# Patient Record
Sex: MALE | URBAN - METROPOLITAN AREA
[De-identification: names, ages, dates, MRNs, and addresses within clinical notes are randomized per-mention and may not be internally consistent; named-entity substitution may affect disease eponyms.]

---

## 2017-03-14 LAB
ALBUMIN SERPL-MCNC: 4.9 G/DL (ref 3.5–5.5)
ALBUMIN/CREAT UR: 24.3 MG/G CREAT (ref 0–30)
ALBUMIN/GLOB SERPL: 1.9 {RATIO} (ref 1.2–2.2)
ALP SERPL-CCNC: 74 IU/L (ref 39–117)
ALT SERPL-CCNC: 14 IU/L (ref 0–44)
AST SERPL-CCNC: 9 IU/L (ref 0–40)
BILIRUB SERPL-MCNC: 0.4 MG/DL (ref 0–1.2)
BUN SERPL-MCNC: 17 MG/DL (ref 6–24)
BUN/CREAT SERPL: 21 (ref 9–20)
CALCIUM SERPL-MCNC: 8.8 MG/DL (ref 8.7–10.2)
CHLORIDE SERPL-SCNC: 102 MMOL/L (ref 96–106)
CHOLEST SERPL-MCNC: 134 MG/DL (ref 100–199)
CO2 SERPL-SCNC: 22 MMOL/L (ref 18–29)
CREAT SERPL-MCNC: 0.82 MG/DL (ref 0.76–1.27)
CREAT UR-MCNC: 177.2 MG/DL
EST. AVERAGE GLUCOSE BLD GHB EST-MCNC: 174 MG/DL
GLOBULIN SER CALC-MCNC: 2.6 G/DL (ref 1.5–4.5)
GLUCOSE SERPL-MCNC: 132 MG/DL (ref 65–99)
HBA1C MFR BLD: 7.7 % (ref 4.8–5.6)
HDLC SERPL-MCNC: 26 MG/DL
INTERPRETATION, 910389: NORMAL
LDLC SERPL CALC-MCNC: 84 MG/DL (ref 0–99)
Lab: NORMAL
MICROALBUMIN UR-MCNC: 43 UG/ML
POTASSIUM SERPL-SCNC: 4.8 MMOL/L (ref 3.5–5.2)
PROT SERPL-MCNC: 7.5 G/DL (ref 6–8.5)
SODIUM SERPL-SCNC: 139 MMOL/L (ref 134–144)
TRIGL SERPL-MCNC: 121 MG/DL (ref 0–149)
TSH SERPL DL<=0.005 MIU/L-ACNC: 1.22 UIU/ML (ref 0.45–4.5)
VLDLC SERPL CALC-MCNC: 24 MG/DL (ref 5–40)

## 2017-03-20 ENCOUNTER — OFFICE VISIT (OUTPATIENT)
Dept: ENDOCRINOLOGY | Age: 57
End: 2017-03-20

## 2017-03-20 VITALS
DIASTOLIC BLOOD PRESSURE: 60 MMHG | TEMPERATURE: 97 F | SYSTOLIC BLOOD PRESSURE: 103 MMHG | HEIGHT: 66 IN | BODY MASS INDEX: 20.72 KG/M2 | HEART RATE: 67 BPM | WEIGHT: 128.9 LBS | RESPIRATION RATE: 16 BRPM

## 2017-03-20 DIAGNOSIS — E11.65 UNCONTROLLED TYPE 2 DIABETES MELLITUS WITH HYPERGLYCEMIA, WITH LONG-TERM CURRENT USE OF INSULIN (HCC): ICD-10-CM

## 2017-03-20 DIAGNOSIS — E11.65 TYPE 2 DIABETES MELLITUS WITH HYPERGLYCEMIA, WITHOUT LONG-TERM CURRENT USE OF INSULIN (HCC): Primary | ICD-10-CM

## 2017-03-20 DIAGNOSIS — E03.4 HYPOTHYROIDISM DUE TO ACQUIRED ATROPHY OF THYROID: ICD-10-CM

## 2017-03-20 DIAGNOSIS — E78.5 HYPERLIPIDEMIA, UNSPECIFIED HYPERLIPIDEMIA TYPE: ICD-10-CM

## 2017-03-20 DIAGNOSIS — E78.2 MIXED HYPERLIPIDEMIA: ICD-10-CM

## 2017-03-20 DIAGNOSIS — Z79.4 UNCONTROLLED TYPE 2 DIABETES MELLITUS WITH HYPERGLYCEMIA, WITH LONG-TERM CURRENT USE OF INSULIN (HCC): ICD-10-CM

## 2017-03-20 DIAGNOSIS — E11.65 UNCONTROLLED TYPE 2 DIABETES MELLITUS WITH HYPERGLYCEMIA, WITHOUT LONG-TERM CURRENT USE OF INSULIN (HCC): ICD-10-CM

## 2017-03-20 RX ORDER — METFORMIN HYDROCHLORIDE 500 MG/1
500 TABLET ORAL 2 TIMES DAILY WITH MEALS
Qty: 180 TAB | Refills: 3 | Status: SHIPPED | OUTPATIENT
Start: 2017-03-20

## 2017-03-20 RX ORDER — LEVOTHYROXINE SODIUM 75 UG/1
75 TABLET ORAL
Qty: 90 TAB | Refills: 3 | Status: SHIPPED | OUTPATIENT
Start: 2017-03-20

## 2017-03-20 RX ORDER — SIMVASTATIN 20 MG/1
20 TABLET, FILM COATED ORAL
Qty: 90 TAB | Refills: 3 | Status: SHIPPED | OUTPATIENT
Start: 2017-03-20

## 2017-03-20 NOTE — PROGRESS NOTES
[facility]               Tila Ríos MD        [address] [address] TM:[phone] Fax [phone] (          Patient Information  Date:3/20/2017  Name : Stella Mcdonald 65 y.o.     YOB: 1960         Referred by: Patient        Chief Complaint   Patient presents with    Diabetes    Thyroid Problem    Cholesterol Problem       History of Present Illness: Stella Mcdonald is a 65 y.o. male here for follow up of  Type 2 Diabetes Mellitus. Type 2 Diabetes was diagnosed in 2005 . End organ effects of diabetes: none. In the past, he was on Glipizide which did not control his diabetes and later on Lantus 8-10 units was added by primary care physician. Reportedly, he had episodes of hypoglycemia and patient stopped insulin along with Glipizide. He could not get Glyxambi ,  He is off Januvia due to expense and recently got the medication   Has been on it for a week       He has increased protein and decreased carbs            Wt Readings from Last 3 Encounters:   03/20/17 128 lb 14.4 oz (58.5 kg)   12/09/16 132 lb (59.9 kg)   08/23/16 130 lb (59 kg)       BP Readings from Last 3 Encounters:   03/20/17 103/60   12/09/16 111/71   08/23/16 106/61           Past Medical History:   Diagnosis Date    Diabetes ([facility] [address]     Hyperlipidemia      Current Outpatient Prescriptions   Medication Sig    SITagliptin (JANUVIA) 100 mg tablet Take 1 Tab by mouth daily. Stop Glyxambi    simvastatin (ZOCOR) 20 mg tablet Take 1 Tab by mouth nightly.  metFORMIN (GLUCOPHAGE) 500 mg tablet Take 1 Tab by mouth two (2) times daily (with meals).  levothyroxine (SYNTHROID) 75 mcg tablet Take 1 Tab by mouth Daily (before breakfast).  dapagliflozin (FARXIGA) 10 mg tab Take 1 Tab by mouth daily. Stop Glyxambi    ASPIRIN (BRUCE LOW STRENGTH PO) Take 81 mg by mouth. No current facility-administered medications for this visit.       Allergies   Allergen Reactions  Other Food Itching     Stawberries, cherries- Throat    Other Plant, Animal, Environmental Itching     Pollen         Review of Systems:  - Constitutional Symptoms: no fevers, no chills  - Eyes: no blurry vision no double vision  - Cardiovascular: no chest pain ,no palpitations  - Integumentary: no rashes  - Neurological: + numbness, tingling, no  headaches  -     Physical Examination:   Blood pressure 103/60, pulse 67, temperature 97 °F (36.1 °C), temperature source Oral, resp. rate 16, height 5' 6\" (1.676 m), weight 128 lb 14.4 oz (58.5 kg). Estimated body mass index is 20.81 kg/(m^2) as calculated from the following:    Height as of this encounter: 5' 6\" (1.676 m). -   Weight as of this encounter: 128 lb 14.4 oz (58.5 kg). - General: pleasant, no distress, good eye contact  - HEENT: no pallor, no periorbital edema, EOMI  - Neck: supple, no thyromegaly, no nodules  - Cardiovascular: regular, normal rate, normal S1 and S2  - Respiratory: clear to auscultation bilaterally  - Gastrointestinal: soft, nontender, nondistended,  BS +  - Musculoskeletal: no acanthosis nigricans,no edema  - Foot - nl   - Psychiatric: normal mood and affect  - Skin: color, texture, turgor normal.       Data Reviewed:     [] Glucose records reviewed. [] See glucose records for details (to be scanned).   [] A1C  [] Reviewed labs    Lab Results   Component Value Date/Time    Hemoglobin A1c 7.7 03/13/2017 08:09 AM    Hemoglobin A1c 7.3 12/02/2016 08:37 AM    Hemoglobin A1c 7.5 08/15/2016 08:39 AM    Glucose 132 03/13/2017 08:09 AM    Glucose  02/24/2015 10:45 AM    Microalb/Creat ratio (ug/mg creat.) 24.3 03/13/2017 08:09 AM    LDL, calculated 84 03/13/2017 08:09 AM    Creatinine 0.82 03/13/2017 08:09 AM      Lab Results   Component Value Date/Time    Cholesterol, total 134 03/13/2017 08:09 AM    HDL Cholesterol 26 03/13/2017 08:09 AM    LDL, calculated 84 03/13/2017 08:09 AM    Triglyceride 121 03/13/2017 08:09 AM     Lab Results Component Value Date/Time    ALT (SGPT) 14 03/13/2017 08:09 AM    AST (SGOT) 9 03/13/2017 08:09 AM    Alk. phosphatase 74 03/13/2017 08:09 AM    Bilirubin, total 0.4 03/13/2017 08:09 AM     Lab Results   Component Value Date/Time    GFR est  03/13/2017 08:09 AM    GFR est non-AA 99 03/13/2017 08:09 AM    Creatinine 0.82 03/13/2017 08:09 AM    BUN 17 03/13/2017 08:09 AM    Sodium 139 03/13/2017 08:09 AM    Potassium 4.8 03/13/2017 08:09 AM    Chloride 102 03/13/2017 08:09 AM    CO2 22 03/13/2017 08:09 AM      Lab Results   Component Value Date/Time    TSH 1.220 03/13/2017 08:09 AM      Assessment/Plan:     1. Type 2 diabetes mellitus with hyperglycemia, without long-term current use of insulin ([facility] [address]    2. Hypothyroidism due to acquired atrophy of thyroid    3. Mixed hyperlipidemia        1. Type 2 Diabetes Mellitus with no known complications  Lab Results   Component Value Date/Time    Hemoglobin A1c 7.7 03/13/2017 08:09 AM    Hemoglobin A1c (POC) 9.1 02/24/2015 10:45 AM      Metformin 500 mg BID   He has no signs of insulin resistance  Januvia and Farxiga    Check blood glucose once daily. His diet is very healthy. FLU annually ,Pneumovax ,aspirin daily,annual eye exam,microalbumin    2. Hypothyroid . No goiter, on LT4    3. Hyperlipidemia : statin    Cannot tolerate ACE due to hypotension           There are no Patient Instructions on file for this visit. Follow-up Disposition: Not on File    Thank you for allowing me to participate in the care of this patient.     George Stone MD

## 2017-03-20 NOTE — PROGRESS NOTES
Donna Davis is a 65 y.o. male here for   Chief Complaint   Patient presents with    Diabetes    Thyroid Problem    Cholesterol Problem       Functional glucose monitor and record keeping system? - yes  Eye exam within last year? - no  Foot exam within last year? - yes    Lab Results   Component Value Date/Time    Hemoglobin A1c 7.7 03/13/2017 08:09 AM    Hemoglobin A1c (POC) 9.1 02/24/2015 10:45 AM       Wt Readings from Last 3 Encounters:   12/09/16 132 lb (59.9 kg)   08/23/16 130 lb (59 kg)   03/04/16 126 lb (57.2 kg)     Temp Readings from Last 3 Encounters:   12/09/16 96.1 °F (35.6 °C) (Oral)   08/23/16 97.5 °F (36.4 °C)   03/04/16 98.6 °F (37 °C) (Oral)     BP Readings from Last 3 Encounters:   12/09/16 111/71   08/23/16 106/61   03/04/16 115/74     Pulse Readings from Last 3 Encounters:   12/09/16 69   08/23/16 73   03/04/16 67

## 2017-03-20 NOTE — MR AVS SNAPSHOT
Visit Information Date & Time Provider Department Dept. Phone Encounter #  
 3/20/2017  4:15 PM Alfonzo Liu MD [facility] [phone] [id number] Follow-up Instructions Return in about 6 months (around 9/20/2017). Your Appointments 9/22/2017  3:45 PM  
ROUTINE CARE with Alfonzo Liu MD  
[facility] [address] [facility]) Appt Note: f/u 6 month. DM  
 [address] [facility] [address]  
[phone]  
  
   
 [address] [place] [address] Upcoming Health Maintenance Date Due Hepatitis C Screening 1960 FOOT EXAM Q1 1/15/1971 EYE EXAM RETINAL OR DILATED Q1 1/15/1971 Pneumococcal 19-64 Medium Risk (1 of 1 - PPSV23) 1/15/1980 DTaP/Tdap/Td series (1 - Tdap) 1/15/1982 FOBT Q 1 YEAR AGE 50-75 1/15/2011 INFLUENZA AGE 9 TO ADULT 8/1/2016 HEMOGLOBIN A1C Q6M 9/13/2017 MICROALBUMIN Q1 3/13/2018 LIPID PANEL Q1 3/13/2018 Allergies as of 3/20/2017  Review Complete On: 3/20/2017 By: Caprice Gil LPN Severity Noted Reaction Type Reactions Other Food  02/24/2015    Itching Stawberries, cherries- Throat Other Plant, Animal, Environmental  04/17/2015    Itching Pollen Current Immunizations  Never Reviewed No immunizations on file. Not reviewed this visit You Were Diagnosed With   
  
 Codes Comments Type 2 diabetes mellitus with hyperglycemia, without long-term current use of insulin (HCC)    -  Primary ICD-10-CM: E11.65 ICD-9-CM: 250.00, 790.29 Hypothyroidism due to acquired atrophy of thyroid     ICD-10-CM: E03.4 ICD-9-CM: 244.8, 246.8 Mixed hyperlipidemia     ICD-10-CM: E78.2 ICD-9-CM: 272.2 Vitals BP Pulse Temp Resp Height(growth percentile) Weight(growth percentile)  103/60 (BP 1 Location: Left arm, BP Patient Position: Sitting) 67 97 °F (36.1 °C) (Oral) 16 5' 6\" (1.676 m) 128 lb 14.4 oz (58.5 kg) BMI Smoking Status 20.81 kg/m2 Current Every Day Smoker BMI and BSA Data Body Mass Index Body Surface Area  
 20.81 kg/m 2 1.65 m 2 Preferred Pharmacy Pharmacy Name Phone [facility] [address], [facility] [facility] [address] [phone] Your Updated Medication List  
  
   
This list is accurate as of: 3/20/17  4:33 PM.  Always use your most recent med list.  
  
  
  
  
 BRUCE LOW STRENGTH PO Take 81 mg by mouth. dapagliflozin 10 mg Tab Commonly known as:  [employer] Take 1 Tab by mouth daily. Stop Glyxambi  
  
 levothyroxine 75 mcg tablet Commonly known as:  SYNTHROID Take 1 Tab by mouth Daily (before breakfast). metFORMIN 500 mg tablet Commonly known as:  GLUCOPHAGE Take 1 Tab by mouth two (2) times daily (with meals). simvastatin 20 mg tablet Commonly known as:  ZOCOR Take 1 Tab by mouth nightly. SITagliptin 100 mg tablet Commonly known as:  Berna Fake Take 1 Tab by mouth daily. Stop Glyxambi Follow-up Instructions Return in about 6 months (around 9/20/2017). To-Do List   
 08/01/2017 Lab:  HEMOGLOBIN A1C WITH EAG   
  
 08/01/2017 Lab:  LIPID PANEL   
  
 08/01/2017 Lab:  METABOLIC PANEL, COMPREHENSIVE   
  
 08/01/2017 Lab:  MICROALBUMIN, UR, RAND W/ MICROALBUMIN/CREA RATIO Introducing [facility]! Thee Bautista introduces linkedFA patient portal. Now you can access parts of your medical record, email your doctor's office, and request medication refills online. 1. In your internet browser, go to [email or web address] 2. Click on the First Time User? Click Here link in the Sign In box. You will see the New Member Sign Up page. 3. Enter your linkedFA Access Code exactly as it appears below.  You will not need to use this code after youve completed the sign-up process. If you do not sign up before the expiration date, you must request a new code. · [employer] Access Code: [email or web address] Expires: 6/18/2017  4:28 PM 
 
4. Enter the last four digits of your Social Security Number (xxxx) and Date of Birth (mm/dd/yyyy) as indicated and click Submit. You will be taken to the next sign-up page. 5. Create a [employer] ID. This will be your [employer] login ID and cannot be changed, so think of one that is secure and easy to remember. 6. Create a [employer] password. You can change your password at any time. 7. Enter your Password Reset Question and Answer. This can be used at a later time if you forget your password. 8. Enter your e-mail address. You will receive e-mail notification when new information is available in [address] 9. Click Sign Up. You can now view and download portions of your medical record. 10. Click the Download Summary menu link to download a portable copy of your medical information. If you have questions, please visit the Frequently Asked Questions section of the [employer] website. Remember, [employer] is NOT to be used for urgent needs. For medical emergencies, dial 911. Now available from your iPhone and Android! Please provide this summary of care documentation to your next provider. Your primary care clinician is listed as Garcia Velazquez. If you have any questions after today's visit, please call [phone].

## 2017-09-17 LAB
ALBUMIN SERPL-MCNC: 4.5 G/DL (ref 3.5–5.5)
ALBUMIN/CREAT UR: 7.4 MG/G CREAT (ref 0–30)
ALBUMIN/GLOB SERPL: 1.5 {RATIO} (ref 1.2–2.2)
ALP SERPL-CCNC: 64 IU/L (ref 39–117)
ALT SERPL-CCNC: 20 IU/L (ref 0–44)
AST SERPL-CCNC: 19 IU/L (ref 0–40)
BILIRUB SERPL-MCNC: 0.6 MG/DL (ref 0–1.2)
BUN SERPL-MCNC: 14 MG/DL (ref 6–24)
BUN/CREAT SERPL: 15 (ref 9–20)
CALCIUM SERPL-MCNC: 9.7 MG/DL (ref 8.7–10.2)
CHLORIDE SERPL-SCNC: 100 MMOL/L (ref 96–106)
CHOLEST SERPL-MCNC: 142 MG/DL (ref 100–199)
CO2 SERPL-SCNC: 26 MMOL/L (ref 18–29)
CREAT SERPL-MCNC: 0.92 MG/DL (ref 0.76–1.27)
CREAT UR-MCNC: 76.9 MG/DL
EST. AVERAGE GLUCOSE BLD GHB EST-MCNC: 163 MG/DL
GLOBULIN SER CALC-MCNC: 3 G/DL (ref 1.5–4.5)
GLUCOSE SERPL-MCNC: 123 MG/DL (ref 65–99)
HBA1C MFR BLD: 7.3 % (ref 4.8–5.6)
HDLC SERPL-MCNC: 40 MG/DL
INTERPRETATION, 910389: NORMAL
LDLC SERPL CALC-MCNC: 78 MG/DL (ref 0–99)
Lab: NORMAL
MICROALBUMIN UR-MCNC: 5.7 UG/ML
POTASSIUM SERPL-SCNC: 4.8 MMOL/L (ref 3.5–5.2)
PROT SERPL-MCNC: 7.5 G/DL (ref 6–8.5)
SODIUM SERPL-SCNC: 140 MMOL/L (ref 134–144)
TRIGL SERPL-MCNC: 119 MG/DL (ref 0–149)
VLDLC SERPL CALC-MCNC: 24 MG/DL (ref 5–40)

## 2017-09-22 ENCOUNTER — TELEPHONE (OUTPATIENT)
Dept: ENDOCRINOLOGY | Age: 57
End: 2017-09-22

## 2017-09-22 NOTE — TELEPHONE ENCOUNTER
Pt came in for appt; I asked him if he had insurance and he said yes, nothing has changed. I told him that we do not have an active insurance policy on file for him and told him that the policy that was on file was ineligible as of March 201. He said \"well I don't know why you don't have it\" and then proceeded to hand me an [employer] \"[employer]\"; I told pt we do not except this policy so if he wanted to be seen he could pay the $100 deposit to be seen and he refused and asked for labs. Gave pt labs but then he asked to speak with the doctor about his insurance and I told him that the doctor was with patients right now and if he had questions about insurance that I can help him and he left.

## 2025-02-03 ENCOUNTER — OFFICE VISIT (OUTPATIENT)
Age: 65
End: 2025-02-03
Payer: COMMERCIAL

## 2025-02-03 VITALS
OXYGEN SATURATION: 95 % | HEIGHT: 66 IN | SYSTOLIC BLOOD PRESSURE: 101 MMHG | BODY MASS INDEX: 24.75 KG/M2 | TEMPERATURE: 97.8 F | DIASTOLIC BLOOD PRESSURE: 62 MMHG | HEART RATE: 79 BPM | WEIGHT: 154 LBS

## 2025-02-03 DIAGNOSIS — I10 HTN (HYPERTENSION), BENIGN: ICD-10-CM

## 2025-02-03 DIAGNOSIS — E03.9 HYPOTHYROIDISM, UNSPECIFIED TYPE: ICD-10-CM

## 2025-02-03 DIAGNOSIS — Z76.89 ENCOUNTER TO ESTABLISH CARE: ICD-10-CM

## 2025-02-03 DIAGNOSIS — E78.5 HYPERLIPIDEMIA, UNSPECIFIED HYPERLIPIDEMIA TYPE: ICD-10-CM

## 2025-02-03 DIAGNOSIS — E11.9 TYPE 2 DIABETES MELLITUS WITHOUT COMPLICATION, WITHOUT LONG-TERM CURRENT USE OF INSULIN (HCC): Primary | ICD-10-CM

## 2025-02-03 DIAGNOSIS — N40.0 BENIGN PROSTATIC HYPERPLASIA, UNSPECIFIED WHETHER LOWER URINARY TRACT SYMPTOMS PRESENT: ICD-10-CM

## 2025-02-03 PROCEDURE — 3074F SYST BP LT 130 MM HG: CPT | Performed by: FAMILY MEDICINE

## 2025-02-03 PROCEDURE — 3078F DIAST BP <80 MM HG: CPT | Performed by: FAMILY MEDICINE

## 2025-02-03 PROCEDURE — 99204 OFFICE O/P NEW MOD 45 MIN: CPT | Performed by: FAMILY MEDICINE

## 2025-02-03 RX ORDER — LOSARTAN POTASSIUM 25 MG/1
25 TABLET ORAL DAILY
COMMUNITY

## 2025-02-03 RX ORDER — PIOGLITAZONE 30 MG/1
30 TABLET ORAL DAILY
COMMUNITY

## 2025-02-03 RX ORDER — GLIMEPIRIDE 4 MG/1
4 TABLET ORAL 2 TIMES DAILY
COMMUNITY

## 2025-02-03 RX ORDER — LEVOTHYROXINE SODIUM 125 UG/1
125 TABLET ORAL DAILY
COMMUNITY
End: 2025-02-04 | Stop reason: DRUGHIGH

## 2025-02-03 RX ORDER — SIMVASTATIN 40 MG
40 TABLET ORAL NIGHTLY
COMMUNITY

## 2025-02-03 SDOH — ECONOMIC STABILITY: FOOD INSECURITY: WITHIN THE PAST 12 MONTHS, THE FOOD YOU BOUGHT JUST DIDN'T LAST AND YOU DIDN'T HAVE MONEY TO GET MORE.: NEVER TRUE

## 2025-02-03 SDOH — ECONOMIC STABILITY: FOOD INSECURITY: WITHIN THE PAST 12 MONTHS, YOU WORRIED THAT YOUR FOOD WOULD RUN OUT BEFORE YOU GOT MONEY TO BUY MORE.: NEVER TRUE

## 2025-02-03 ASSESSMENT — PATIENT HEALTH QUESTIONNAIRE - PHQ9
SUM OF ALL RESPONSES TO PHQ9 QUESTIONS 1 & 2: 0
SUM OF ALL RESPONSES TO PHQ QUESTIONS 1-9: 0
SUM OF ALL RESPONSES TO PHQ QUESTIONS 1-9: 0
2. FEELING DOWN, DEPRESSED OR HOPELESS: NOT AT ALL
SUM OF ALL RESPONSES TO PHQ QUESTIONS 1-9: 0
SUM OF ALL RESPONSES TO PHQ QUESTIONS 1-9: 0
1. LITTLE INTEREST OR PLEASURE IN DOING THINGS: NOT AT ALL

## 2025-02-03 NOTE — PROGRESS NOTES
[facility] Family Medicine Residency   [facility] Sports Medicine      Chief Complaint:   Chief Complaint   Patient presents with    [facility]     Previous PCP- Jennifer Navarrete Dr  [phone]         SUBJECTIVE:  Mumtaz Shaw is a 65 y.o. male who presents to [facility].    DM: Currently taking Jardiance 25mg daily, Glimepiride 5mg BID, Metformin 1000mg BID, and Actos 30mg daily.   Last A1c was 6.9 about 3 months ago.     HL: Currenlty taking Zocor 40mg daily.    Hypothyroidism: Currently taking Synthroid 125mcg daily.    HTN: Currently taking Cozaar 25mg daily.      Last colonoscopy: 2021 w/ polyps.  Next colonoscopy 2026.      PMHx:  No past medical history on file.    Meds:   Current Outpatient Medications   Medication Sig Dispense Refill    metFORMIN (GLUCOPHAGE) 1000 MG tablet Take 1 tablet by mouth 2 times daily (with meals) Patient taking 500 mg before breakfast and 1000 mg after dinner      glimepiride (AMARYL) 4 MG tablet Take 1 tablet by mouth 2 times daily      pioglitazone (ACTOS) 30 MG tablet Take 1 tablet by mouth daily      simvastatin (ZOCOR) 40 MG tablet Take 1 tablet by mouth nightly      levothyroxine (SYNTHROID) 125 MCG tablet Take 1 tablet by mouth Daily      losartan (COZAAR) 25 MG tablet Take 1 tablet by mouth daily      empagliflozin (JARDIANCE) 25 MG tablet Take 1 tablet by mouth daily       No current facility-administered medications for this visit.       Allergies:   No Known Allergies    Smoker:  Social History     Tobacco Use   Smoking Status Every Day    Current packs/day: 0.50    Average packs/day: 0.5 packs/day for 40.0 years (20.0 ttl pk-yrs)    Types: Cigarettes   Smokeless Tobacco Never       ETOH:   Social History     Substance and Sexual Activity   Alcohol Use Yes       FH: No family history on file.    ROS:  General/Constitutional:   No headache, fever, fatigue, weight loss or weight gain       Eyes:   No visual

## 2025-02-03 NOTE — PROGRESS NOTES
Identified pt with two pt identifiers(name and ). Reviewed record in preparation for visit and have obtained necessary documentation.  Chief Complaint   Patient presents with    Establish Care     Previous PCP- Jennifer Navarrete Dr  [phone]          Vitals:    25 1059   BP: 101/62   Pulse: 79   Temp: 97.8 °F (36.6 °C)   TempSrc: Oral   SpO2: 95%   Weight: 69.9 kg (154 lb)   Height: 1.676 m (5' 6\")         Coordination of Care Questionnaire:  :     \"Have you been to the ER, urgent care clinic since your last visit?  Hospitalized since your last visit?\"    NO    “Have you seen or consulted any other health care providers outside of [facility] since your last visit?”    NO        “Have you had a colorectal cancer screening such as a colonoscopy/FIT/Cologuard?    NO    No colonoscopy on file  No cologuard on file  No FIT/FOBT on file   No flexible sigmoidoscopy on file         Click Here for Release of Records Request

## 2025-02-04 LAB
ALBUMIN SERPL-MCNC: 4.1 G/DL (ref 3.5–5)
ALBUMIN/GLOB SERPL: 1.2 (ref 1.1–2.2)
ALP SERPL-CCNC: 76 U/L (ref 45–117)
ALT SERPL-CCNC: 27 U/L (ref 12–78)
ANION GAP SERPL CALC-SCNC: 5 MMOL/L (ref 2–12)
AST SERPL-CCNC: 16 U/L (ref 15–37)
BILIRUB SERPL-MCNC: 0.6 MG/DL (ref 0.2–1)
BUN SERPL-MCNC: 9 MG/DL (ref 6–20)
BUN/CREAT SERPL: 9 (ref 12–20)
CALCIUM SERPL-MCNC: 10.1 MG/DL (ref 8.5–10.1)
CHLORIDE SERPL-SCNC: 106 MMOL/L (ref 97–108)
CHOLEST SERPL-MCNC: 133 MG/DL
CO2 SERPL-SCNC: 26 MMOL/L (ref 21–32)
CREAT SERPL-MCNC: 0.98 MG/DL (ref 0.7–1.3)
ERYTHROCYTE [DISTWIDTH] IN BLOOD BY AUTOMATED COUNT: 13.7 % (ref 11.5–14.5)
EST. AVERAGE GLUCOSE BLD GHB EST-MCNC: 143 MG/DL
GLOBULIN SER CALC-MCNC: 3.4 G/DL (ref 2–4)
GLUCOSE SERPL-MCNC: 67 MG/DL (ref 65–100)
HBA1C MFR BLD: 6.6 % (ref 4–5.6)
HCT VFR BLD AUTO: 42.8 % (ref 36.6–50.3)
HDLC SERPL-MCNC: 41 MG/DL
HDLC SERPL: 3.2 (ref 0–5)
HGB BLD-MCNC: 13.6 G/DL (ref 12.1–17)
LDLC SERPL CALC-MCNC: 56.8 MG/DL (ref 0–100)
MCH RBC QN AUTO: 28.7 PG (ref 26–34)
MCHC RBC AUTO-ENTMCNC: 31.8 G/DL (ref 30–36.5)
MCV RBC AUTO: 90.3 FL (ref 80–99)
NRBC # BLD: 0 K/UL (ref 0–0.01)
NRBC BLD-RTO: 0 PER 100 WBC
PLATELET # BLD AUTO: 152 K/UL (ref 150–400)
PMV BLD AUTO: 12.2 FL (ref 8.9–12.9)
POTASSIUM SERPL-SCNC: 4.4 MMOL/L (ref 3.5–5.1)
PROT SERPL-MCNC: 7.5 G/DL (ref 6.4–8.2)
RBC # BLD AUTO: 4.74 M/UL (ref 4.1–5.7)
SODIUM SERPL-SCNC: 137 MMOL/L (ref 136–145)
TRIGL SERPL-MCNC: 176 MG/DL
TSH SERPL DL<=0.05 MIU/L-ACNC: 0.09 UIU/ML (ref 0.36–3.74)
VLDLC SERPL CALC-MCNC: 35.2 MG/DL
WBC # BLD AUTO: 8.3 K/UL (ref 4.1–11.1)

## 2025-02-04 RX ORDER — LEVOTHYROXINE SODIUM 112 UG/1
112 TABLET ORAL DAILY
Qty: 90 TABLET | Refills: 1 | Status: SHIPPED | OUTPATIENT
Start: 2025-02-04

## 2025-02-04 NOTE — PROGRESS NOTES
Labs reviewed. TSH low.  Will decrease Synthroid to 112mg daily from 125mcg daily.  Recheck in 3 months with scheduled diabetic labs.

## 2025-02-05 LAB
PSA SERPL-MCNC: 0.4 NG/ML (ref 0–4)
REFLEX CRITERIA: NORMAL

## 2025-02-06 ENCOUNTER — TELEPHONE (OUTPATIENT)
Age: 65
End: 2025-02-06

## 2025-02-06 NOTE — TELEPHONE ENCOUNTER
----- Message from Dr. Sylvester Galicia MD sent at 2/4/2025  2:45 PM EST -----  Please call and tell patient that his labs show a low TSH (thyroid test).  I'm sending a reduced dose of Synthroid of 112mcg daily from 125mcg daily.  His other labs are good.   Recheck labs in 3 months. Thanks.

## 2025-02-17 ENCOUNTER — TELEPHONE (OUTPATIENT)
Age: 65
End: 2025-02-17

## 2025-02-17 NOTE — TELEPHONE ENCOUNTER
S/w pt's son who used two identifiers (Name & )  Per Dr. Soares, if pt was traveling from the [facility] we would be able to exempt him but because the pt is traveling from [place] to [facility] they would in fact need to get the yellow fever vaccine.    Detail Level: Detailed Show Topical Anesthesia Variable?: Yes Include Z78.9 (Other Specified Conditions Influencing Health Status) As An Associated Diagnosis?: No Post-Care Instructions: I reviewed with the patient in detail post-care instructions. Patient is to wear sunprotection, and avoid picking at any of the treated lesions. Pt may apply Vaseline to crusted or scabbing areas. Medical Necessity Clause: This procedure was medically necessary because the lesions that were treated were: Spray Paint Text: The liquid nitrogen was applied to the skin utilizing a spray paint frosting technique. Medical Necessity Information: It is in your best interest to select a reason for this procedure from the list below. All of these items fulfill various CMS LCD requirements except the new and changing color options. Consent: The patient's consent was obtained including but not limited to risks of crusting, scabbing, blistering, scarring, darker or lighter pigmentary change, recurrence, incomplete removal and infection. Duration Of Freeze Thaw-Cycle (Seconds): 1

## 2025-02-17 NOTE — TELEPHONE ENCOUNTER
Pt stated that he is in [place]; however, he is not being allowed to enter into [facility] due to not having Yellow Fever vaccination. He reported that CDC states that anyone 59 and over can be exempted; however, authority in [facility] is requiring you to submit an Exemption letter/form on CDC website with your signature. Pt is requesting that you contact his son Cass Shaw at [phone], if you have any questions or with any updates.    Thank you

## 2025-05-05 ENCOUNTER — OFFICE VISIT (OUTPATIENT)
Age: 65
End: 2025-05-05
Payer: COMMERCIAL

## 2025-05-05 VITALS
RESPIRATION RATE: 18 BRPM | TEMPERATURE: 98.2 F | OXYGEN SATURATION: 97 % | WEIGHT: 155.6 LBS | SYSTOLIC BLOOD PRESSURE: 90 MMHG | DIASTOLIC BLOOD PRESSURE: 55 MMHG | BODY MASS INDEX: 25.01 KG/M2 | HEIGHT: 66 IN | HEART RATE: 67 BPM

## 2025-05-05 DIAGNOSIS — E03.9 HYPOTHYROIDISM, UNSPECIFIED TYPE: ICD-10-CM

## 2025-05-05 DIAGNOSIS — E78.5 HYPERLIPIDEMIA, UNSPECIFIED HYPERLIPIDEMIA TYPE: ICD-10-CM

## 2025-05-05 DIAGNOSIS — E11.9 TYPE 2 DIABETES MELLITUS WITHOUT COMPLICATION, WITHOUT LONG-TERM CURRENT USE OF INSULIN (HCC): Primary | ICD-10-CM

## 2025-05-05 DIAGNOSIS — I10 HTN (HYPERTENSION), BENIGN: ICD-10-CM

## 2025-05-05 LAB
ALBUMIN SERPL-MCNC: 4.1 G/DL (ref 3.5–5)
ALBUMIN/GLOB SERPL: 1.3 (ref 1.1–2.2)
ALP SERPL-CCNC: 88 U/L (ref 45–117)
ALT SERPL-CCNC: 25 U/L (ref 12–78)
ANION GAP SERPL CALC-SCNC: 5 MMOL/L (ref 2–12)
AST SERPL-CCNC: 12 U/L (ref 15–37)
BILIRUB SERPL-MCNC: 0.7 MG/DL (ref 0.2–1)
BUN SERPL-MCNC: 14 MG/DL (ref 6–20)
BUN/CREAT SERPL: 14 (ref 12–20)
CALCIUM SERPL-MCNC: 10 MG/DL (ref 8.5–10.1)
CHLORIDE SERPL-SCNC: 105 MMOL/L (ref 97–108)
CO2 SERPL-SCNC: 28 MMOL/L (ref 21–32)
CREAT SERPL-MCNC: 0.97 MG/DL (ref 0.7–1.3)
EST. AVERAGE GLUCOSE BLD GHB EST-MCNC: 146 MG/DL
GLOBULIN SER CALC-MCNC: 3.2 G/DL (ref 2–4)
GLUCOSE SERPL-MCNC: 125 MG/DL (ref 65–100)
HBA1C MFR BLD: 6.7 % (ref 4–5.6)
POTASSIUM SERPL-SCNC: 4.4 MMOL/L (ref 3.5–5.1)
PROT SERPL-MCNC: 7.3 G/DL (ref 6.4–8.2)
SODIUM SERPL-SCNC: 138 MMOL/L (ref 136–145)
TSH SERPL DL<=0.05 MIU/L-ACNC: 0.09 UIU/ML (ref 0.36–3.74)

## 2025-05-05 PROCEDURE — 99214 OFFICE O/P EST MOD 30 MIN: CPT | Performed by: FAMILY MEDICINE

## 2025-05-05 PROCEDURE — 3074F SYST BP LT 130 MM HG: CPT | Performed by: FAMILY MEDICINE

## 2025-05-05 PROCEDURE — 3078F DIAST BP <80 MM HG: CPT | Performed by: FAMILY MEDICINE

## 2025-05-05 PROCEDURE — 3044F HG A1C LEVEL LT 7.0%: CPT | Performed by: FAMILY MEDICINE

## 2025-05-05 NOTE — PROGRESS NOTES
Identified pt with two pt identifiers(name and ). Reviewed record in preparation for visit and have obtained necessary documentation.  Chief Complaint   Patient presents with    Diabetes        Vitals:    25 0834 25 0836   BP: (!) 96/58 (!) 90/55   BP Site: Right Upper Arm Left Upper Arm   Patient Position: Sitting Sitting   BP Cuff Size: Medium Adult Medium Adult   Pulse: 67    Resp: 18    Temp: 98.2 °F (36.8 °C)    TempSrc: Temporal    SpO2: 97%    Weight: 70.6 kg (155 lb 9.6 oz)    Height: 1.676 m (5' 6\")          Coordination of Care Questionnaire:  :     \"Have you been to the ER, urgent care clinic since your last visit?  Hospitalized since your last visit?\"    NO    “Have you seen or consulted any other health care providers outside of [facility] since your last visit?”    NO        “Have you had a colorectal cancer screening such as a colonoscopy/FIT/Cologuard?    NO    No colonoscopy on file  No cologuard on file  No FIT/FOBT on file   No flexible sigmoidoscopy on file         Click Here for Release of Records Request

## 2025-05-05 NOTE — PROGRESS NOTES
[facility]  [facility] Family Medicine Residency   [facility] Sports Medicine      Chief Complaint:   Chief Complaint   Patient presents with    Diabetes       SUBJECTIVE:  Mumtaz Shaw is a 65 y.o. male who presents for    DM: Currently taking Jardiance 25mg daily, Glimepiride 5mg BID, Metformin 1000mg -1/2 tab AM and 1 tab PM, and Actos 30mg daily.  He is also taking Vildagliptin 50mg daily which he got from [place] a couple of months ago which he said was started by a doctor in [place].  This medicine is not available in the [place].   Last A1c was 6.6 about 3 months ago so unsure why he was started on an additional medication.     HL: Currenlty taking Zocor 40mg daily.    Hypothyroidism: Previously taking Synthroid 125mcg daily.  This was reduced to Synthroid 112mcg daily when TSH was low at 0.09 about 3 months ago.     HTN: Currently taking Cozaar 25mg daily.      Last colonoscopy: 2021 w/ polyps.  Next colonoscopy 2026.      PMHx:  Past Medical History:   Diagnosis Date    Diabetes (HCC)     HTN (hypertension)     Hyperlipidemia     Hypothyroidism        Meds:   Current Outpatient Medications   Medication Sig Dispense Refill    levothyroxine (SYNTHROID) 112 MCG tablet Take 1 tablet by mouth daily 90 tablet 1    metFORMIN (GLUCOPHAGE) 1000 MG tablet Take 1 tablet by mouth 2 times daily (with meals) Patient taking 500 mg before breakfast and 1000 mg after dinner      glimepiride (AMARYL) 4 MG tablet Take 1 tablet by mouth 2 times daily      pioglitazone (ACTOS) 30 MG tablet Take 1 tablet by mouth daily      simvastatin (ZOCOR) 40 MG tablet Take 1 tablet by mouth nightly      losartan (COZAAR) 25 MG tablet Take 1 tablet by mouth daily      empagliflozin (JARDIANCE) 25 MG tablet Take 1 tablet by mouth daily       No current facility-administered medications for this visit.       Allergies:   No Known Allergies    Smoker:  Social History     Tobacco Use   Smoking Status Every Day    Current

## 2025-05-06 RX ORDER — LEVOTHYROXINE SODIUM 100 UG/1
100 TABLET ORAL DAILY
Qty: 90 TABLET | Refills: 1 | Status: SHIPPED | OUTPATIENT
Start: 2025-05-06

## 2025-05-06 NOTE — PROGRESS NOTES
Called patient and discussed labs.  TSH still low.  Will reduce Synthroid from 112mcg daily to 100mcg daily.  Recheck labs in 3 months.  A1c was 6.7.  Other labs are good.

## 2025-06-12 ENCOUNTER — TELEPHONE (OUTPATIENT)
Age: 65
End: 2025-06-12

## 2025-06-12 NOTE — TELEPHONE ENCOUNTER
Patient stop by the office requesting that Dr. Grayson burrows need to schedule an appointment (Type 2 diabetes) with Dr. Massimo Vasques the number [phone] for him and his wife. Patients stated that is the only way they can get schedule to see that doctor if Dr. Galicia nurse schedule it. Once schedule call patient with the date.     [phone]

## 2025-06-18 DIAGNOSIS — E11.9 TYPE 2 DIABETES MELLITUS WITHOUT COMPLICATION, WITHOUT LONG-TERM CURRENT USE OF INSULIN (HCC): Primary | ICD-10-CM

## 2025-06-18 NOTE — PROGRESS NOTES
Patient requesting referral for Endocrinology.  Referral placed for Dr. Massimo Vasques the number [phone] per patient request.

## 2025-06-20 ENCOUNTER — TELEPHONE (OUTPATIENT)
Age: 65
End: 2025-06-20

## 2025-06-20 NOTE — TELEPHONE ENCOUNTER
Called and spoke with patient to inform him that the preferred doctor he choose (Dr. Massimo Vasques) dose not participate with his insurance.    If patient decides not to see Dr. Vasques,  he may contact [facility] - Evie Salcedo Md at [phone].      *[employer] certification has been obtained under Case# [id number].

## 2025-07-08 ENCOUNTER — TELEPHONE (OUTPATIENT)
Age: 65
End: 2025-07-08

## 2025-07-08 NOTE — TELEPHONE ENCOUNTER
Called patient to inform, Dr. Evie Salcedo declined his referral.   Provided [facility] contact information.     [facility] - Endocrinology  [phone]

## 2025-07-18 ENCOUNTER — COMMUNITY OUTREACH (OUTPATIENT)
Age: 65
End: 2025-07-18

## 2025-09-03 ENCOUNTER — OFFICE VISIT (OUTPATIENT)
Age: 65
End: 2025-09-03
Payer: COMMERCIAL

## 2025-09-03 VITALS
DIASTOLIC BLOOD PRESSURE: 50 MMHG | HEART RATE: 72 BPM | BODY MASS INDEX: 24.11 KG/M2 | TEMPERATURE: 98.2 F | WEIGHT: 150 LBS | RESPIRATION RATE: 18 BRPM | SYSTOLIC BLOOD PRESSURE: 112 MMHG | OXYGEN SATURATION: 99 % | HEIGHT: 66 IN

## 2025-09-03 DIAGNOSIS — E03.9 HYPOTHYROIDISM, UNSPECIFIED TYPE: ICD-10-CM

## 2025-09-03 DIAGNOSIS — E78.5 HYPERLIPIDEMIA, UNSPECIFIED HYPERLIPIDEMIA TYPE: ICD-10-CM

## 2025-09-03 DIAGNOSIS — I10 HTN (HYPERTENSION), BENIGN: ICD-10-CM

## 2025-09-03 DIAGNOSIS — E11.9 TYPE 2 DIABETES MELLITUS WITHOUT COMPLICATION, WITHOUT LONG-TERM CURRENT USE OF INSULIN (HCC): Primary | ICD-10-CM

## 2025-09-03 PROCEDURE — 99214 OFFICE O/P EST MOD 30 MIN: CPT | Performed by: FAMILY MEDICINE

## 2025-09-03 PROCEDURE — 3074F SYST BP LT 130 MM HG: CPT | Performed by: FAMILY MEDICINE

## 2025-09-03 PROCEDURE — 3044F HG A1C LEVEL LT 7.0%: CPT | Performed by: FAMILY MEDICINE

## 2025-09-03 PROCEDURE — 3078F DIAST BP <80 MM HG: CPT | Performed by: FAMILY MEDICINE

## 2025-09-03 RX ORDER — SILDENAFIL 100 MG/1
100 TABLET, FILM COATED ORAL PRN
Qty: 30 TABLET | Refills: 3 | Status: SHIPPED | OUTPATIENT
Start: 2025-09-03

## 2025-09-03 RX ORDER — PIOGLITAZONE 15 MG/1
15 TABLET ORAL DAILY
Qty: 90 TABLET | Refills: 1 | Status: SHIPPED | OUTPATIENT
Start: 2025-09-03

## 2025-09-04 LAB
EST. AVERAGE GLUCOSE BLD GHB EST-MCNC: 173 MG/DL
HBA1C MFR BLD: 7.7 % (ref 4–5.6)